# Patient Record
Sex: FEMALE | URBAN - METROPOLITAN AREA
[De-identification: names, ages, dates, MRNs, and addresses within clinical notes are randomized per-mention and may not be internally consistent; named-entity substitution may affect disease eponyms.]

---

## 2020-01-31 ENCOUNTER — OFFICE VISIT (OUTPATIENT)
Dept: OBGYN | Facility: CLINIC | Age: 30
End: 2020-01-31
Attending: NURSE PRACTITIONER
Payer: COMMERCIAL

## 2020-01-31 VITALS
BODY MASS INDEX: 21.09 KG/M2 | HEART RATE: 121 BPM | WEIGHT: 119 LBS | SYSTOLIC BLOOD PRESSURE: 126 MMHG | HEIGHT: 63 IN | DIASTOLIC BLOOD PRESSURE: 80 MMHG

## 2020-01-31 DIAGNOSIS — Z01.812 PRE-PROCEDURE LAB EXAM: ICD-10-CM

## 2020-01-31 DIAGNOSIS — Z83.2 FAMILY HISTORY OF FACTOR V DEFICIENCY: ICD-10-CM

## 2020-01-31 DIAGNOSIS — Z30.430 ENCOUNTER FOR INSERTION OF INTRAUTERINE CONTRACEPTIVE DEVICE: Primary | ICD-10-CM

## 2020-01-31 LAB
HCG UR QL: NEGATIVE
INTERNAL QC OK POCT: YES

## 2020-01-31 PROCEDURE — 58300 INSERT INTRAUTERINE DEVICE: CPT | Mod: ZF | Performed by: NURSE PRACTITIONER

## 2020-01-31 PROCEDURE — 87491 CHLMYD TRACH DNA AMP PROBE: CPT | Performed by: NURSE PRACTITIONER

## 2020-01-31 PROCEDURE — 25000125 ZZHC RX 250: Mod: ZF | Performed by: NURSE PRACTITIONER

## 2020-01-31 PROCEDURE — 81025 URINE PREGNANCY TEST: CPT | Mod: ZF | Performed by: NURSE PRACTITIONER

## 2020-01-31 PROCEDURE — 87591 N.GONORRHOEAE DNA AMP PROB: CPT | Performed by: NURSE PRACTITIONER

## 2020-01-31 PROCEDURE — G0463 HOSPITAL OUTPT CLINIC VISIT: HCPCS | Mod: 25,ZF

## 2020-01-31 RX ORDER — COPPER 313.4 MG/1
1 INTRAUTERINE DEVICE INTRAUTERINE ONCE
COMMUNITY

## 2020-01-31 RX ORDER — COPPER 313.4 MG/1
1 INTRAUTERINE DEVICE INTRAUTERINE ONCE
Status: COMPLETED
Start: 2020-01-31 | End: 2020-01-31

## 2020-01-31 RX ADMIN — COPPER 1 EACH: 313.4 INTRAUTERINE DEVICE INTRAUTERINE at 09:38

## 2020-01-31 SDOH — HEALTH STABILITY: MENTAL HEALTH: HOW MANY DRINKS CONTAINING ALCOHOL DO YOU HAVE ON A TYPICAL DAY WHEN YOU ARE DRINKING?: 1 OR 2

## 2020-01-31 SDOH — HEALTH STABILITY: MENTAL HEALTH: HOW OFTEN DO YOU HAVE 6 OR MORE DRINKS ON ONE OCCASION?: NEVER

## 2020-01-31 SDOH — HEALTH STABILITY: MENTAL HEALTH: HOW OFTEN DO YOU HAVE A DRINK CONTAINING ALCOHOL?: 2-4 TIMES A MONTH

## 2020-01-31 SDOH — HEALTH STABILITY: MENTAL HEALTH: HOW OFTEN DO YOU HAVE SIX OR MORE DRINKS ON ONE OCCASION?: NEVER

## 2020-01-31 SDOH — HEALTH STABILITY: MENTAL HEALTH: HOW MANY STANDARD DRINKS CONTAINING ALCOHOL DO YOU HAVE ON A TYPICAL DAY?: 1 OR 2

## 2020-01-31 ASSESSMENT — PATIENT HEALTH QUESTIONNAIRE - PHQ9
5. POOR APPETITE OR OVEREATING: NOT AT ALL
SUM OF ALL RESPONSES TO PHQ QUESTIONS 1-9: 0

## 2020-01-31 ASSESSMENT — PAIN SCALES - GENERAL: PAINLEVEL: NO PAIN (0)

## 2020-01-31 ASSESSMENT — ANXIETY QUESTIONNAIRES
2. NOT BEING ABLE TO STOP OR CONTROL WORRYING: NOT AT ALL
1. FEELING NERVOUS, ANXIOUS, OR ON EDGE: NOT AT ALL
7. FEELING AFRAID AS IF SOMETHING AWFUL MIGHT HAPPEN: NOT AT ALL
IF YOU CHECKED OFF ANY PROBLEMS ON THIS QUESTIONNAIRE, HOW DIFFICULT HAVE THESE PROBLEMS MADE IT FOR YOU TO DO YOUR WORK, TAKE CARE OF THINGS AT HOME, OR GET ALONG WITH OTHER PEOPLE: NOT DIFFICULT AT ALL
5. BEING SO RESTLESS THAT IT IS HARD TO SIT STILL: NOT AT ALL
3. WORRYING TOO MUCH ABOUT DIFFERENT THINGS: NOT AT ALL
6. BECOMING EASILY ANNOYED OR IRRITABLE: NOT AT ALL
GAD7 TOTAL SCORE: 0

## 2020-01-31 ASSESSMENT — MIFFLIN-ST. JEOR: SCORE: 1243.91

## 2020-01-31 NOTE — PATIENT INSTRUCTIONS
IUD information:     Benefits: The IUD can be 97-99% effective when carefully following directions regarding use. It can be more effective if used with additional contraception. IUD containing progestin may decrease menstrual flow and menstrual cramping.     Risks/Side Effects: include but are not limited to spotting, bleeding, hemorrhage, or anemia: cramping or pain: partial or complete expulsion of device; lost IUD strings; uterine or cervical perforation; embedding of IUD in the uterine wall; increased risk of pelvic inflammatory disease. Women who become pregnant with an IUD in place are at a higher risk for ectopic pregnancy should a pregnancy occur with an IUD in situ. There is a higher rate of miscarriage when pregnancy occurs with IUD in place.    Warning signs: Please call clinic if you have abnormal spotting or heavy bleeding, abdominal pain, pain with intercourse, fever, chills, flu like symptoms, or unable to locate strings of IUD, or strings are longer or shorter than expected.    You are encouraged to use condoms for prevention of STD. You may use pain medications (ibuprofen) as needed for mild to moderate pain. Please follow-up in clinic in 4-6 weeks for IUD string check     This IUD is effective for 10-12 yrs.    Return to clinic for annual preventative exam in 11/2020.

## 2020-01-31 NOTE — LETTER
2/4/2020         Chloe Juarez   [address] [place] [address]        Dear Ms. Juarez:    The results of your recent gonorrhea & chlamydia were normal.     Results for orders placed or performed in visit on 01/31/20   hCG qual urine POCT     Status: None   Result Value Ref Range    HCG Qual Urine Negative neg    Internal QC OK Yes    Chlamydia PCR (Clinic Collect)     Status: None   Result Value Ref Range    Specimen Description Cervix     Chlamydia Trachomatis PCR Negative NEG^Negative   Gonorrhea PCR     Status: None   Result Value Ref Range    Specimen Descrip Cervix     N Gonorrhea PCR Negative NEG^Negative         Please note that test explanations are brief and do not reflect all diagnostic uses.  If you have any questions or concerns, please call the clinic at [phone].      Sincerely,  Bernadette Aldana, YNONY, APRN, WHNP

## 2020-01-31 NOTE — NURSING NOTE
Clinic Administered Medication Documentation    MEDICATION LIST:   Intrauterine/Implant Insertion Documentation    Device was placed by provider (please see MAR for given by information). Please see MAR and medication order for additional information.     Type: Paragard  Remove/Replace by: Bernadette Hanna     Exp: 1-2026

## 2020-01-31 NOTE — PROGRESS NOTES
"IUD Insertion:  CONSULT:    Is a pregnancy test required: Yes.  Was it positive or negative?  Negative  Was a consent obtained?  Yes    Subjective: Chloe Juarez is a 29 year old  presents for IUD and desires Paragard type IUD for contraception.     Patient's sister has a hx of Factor V Leiden and has gotten a blood clot. The patient has completed evaluation for this, and does not have Factor V, but has an \"elevated protein\" level and was recommended not use hormonal birth control. She was recommended to follow-up with a hematologist if she desired to get pregnant.     Last sexually active 6-9 months ago.   Menses: q26-28 days, bleeding lasts 4-5 days, first day is heavy and then light. Dysmenorrhea x 1 day.     Chloe moved to [facility] from [place] in October to live with her fiance. Their wedding is May 2020.     Patient has been given the opportunity to ask questions about all forms of birth control, including all options appropriate for Chloe Juarez. Discussed that no method of birth control, except abstinence is 100% effective against pregnancy or sexually transmitted infection.     Chloe Juarez understands she may have the IUD removed at any time. IUD should be removed by a health care provider.    The entire insertion procedure was reviewed with the patient, including care after placement.    Patient's last menstrual period was 2020. Last sexual activity: 6-8 months ago. No allergy to betadine or shellfish. Patient desires STD screening  HCG Qual Urine   Date Value Ref Range Status   2020 Negative neg Final       /80   Pulse 121   Ht 1.6 m (5' 3\")   Wt 54 kg (119 lb)   LMP 2020   BMI 21.08 kg/m      Pelvic Exam:   EG/BUS: normal genital architecture without lesions, erythema or abnormal secretions.   Vagina: moist, pink, rugae with physiologic discharge and secretions  Cervix: nuliparous no lesions and pink, moist, closed, without lesion or CMT  Uterus: " anteverted position, mobile, no pain  Adnexa: within normal limits and no masses, nodularity, tenderness    PROCEDURE NOTE: -- IUD Insertion    Reason for Insertion: contraception    Premedicated with ibuprofen.  Under sterile technique, cervix was visualized with speculum and prepped with Betadine solution swab x 3. Tenaculum was placed for stability. The uterus was gently straightened and sounded to 7.0 cm. IUD prepared for placement, and IUD inserted according to 's instructions without difficulty or significant resitance, and deployed at the fundus. The strings were visualized and trimmed to 3.0 cm from the external os. Tenaculum was removed and hemostasis noted. Speculum removed.  Patient tolerated procedure well.    Lot # [id number]  Exp: Jan 2026    EBL: minimal    Complications: none    ASSESSMENT:     ICD-10-CM    1. Encounter for insertion of intrauterine contraceptive device Z30.430 paragard intrauterine copper device     paragard intrauterine copper IUD device 1 each     INSERTION INTRAUTERINE DEVICE   2. Pre-procedure lab exam Z01.812 hCG qual urine POCT     Chlamydia PCR (Clinic Collect)     Gonorrhea PCR        PLAN:    Given 's handouts, including when to have IUD removed, list of danger s/sx, side effects and follow up recommended. Encouraged condom use for prevention of STD. Advised to call for any fever, for prolonged or severe pain or bleeding, abnormal vaginal discharge, or unable to palpate strings. She was advised to use pain medications (ibuprofen) as needed for mild to moderate pain. Advised to follow-up in clinic in 4-6 weeks for IUD string check. This IUD is effective immediately for contraception. It may remain in place for 10-12 yrs.  Gonorrhea & chlamydia tests were completed today. Pt will be notified of results within 3-5 days by [facility].     Chloe expressed understanding and agreement with the plan for care.     Bernadette Aldana, ROSELIA CNP

## 2020-01-31 NOTE — LETTER
"2020       RE: Chloe Juarez  [address] [place] [address]     Dear Colleague,    Thank you for referring your patient, Chloe Juarez, to the [facility] Please see a copy of my visit note below.    IUD Insertion:  CONSULT:    Is a pregnancy test required: Yes.  Was it positive or negative?  Negative  Was a consent obtained?  Yes    Subjective: Chloe Juarez is a 29 year old  presents for IUD and desires Paragard type IUD for contraception.     Patient's sister has a hx of Factor V Leiden and has gotten a blood clot. The patient has completed evaluation for this, and does not have Factor V, but has an \"elevated protein\" level and was recommended not use hormonal birth control. She was recommended to follow-up with a hematologist if she desired to get pregnant.     Last sexually active 6-9 months ago.   Menses: q26-28 days, bleeding lasts 4-5 days, first day is heavy and then light. Dysmenorrhea x 1 day.     Chloe moved to [facility] from [place] in October to live with her fiance. Their wedding is May 2020.     Patient has been given the opportunity to ask questions about all forms of birth control, including all options appropriate for Chloe Juarez. Discussed that no method of birth control, except abstinence is 100% effective against pregnancy or sexually transmitted infection.     Chloe Juarez understands she may have the IUD removed at any time. IUD should be removed by a health care provider.    The entire insertion procedure was reviewed with the patient, including care after placement.    Patient's last menstrual period was 2020. Last sexual activity: 6-8 months ago. No allergy to betadine or shellfish. Patient desires STD screening  HCG Qual Urine   Date Value Ref Range Status   2020 Negative neg Final       /80   Pulse 121   Ht 1.6 m (5' 3\")   Wt 54 kg (119 lb)   LMP " 01/21/2020   BMI 21.08 kg/m       Pelvic Exam:   EG/BUS: normal genital architecture without lesions, erythema or abnormal secretions.   Vagina: moist, pink, rugae with physiologic discharge and secretions  Cervix: nuliparous no lesions and pink, moist, closed, without lesion or CMT  Uterus: anteverted position, mobile, no pain  Adnexa: within normal limits and no masses, nodularity, tenderness    PROCEDURE NOTE: -- IUD Insertion    Reason for Insertion: contraception    Premedicated with ibuprofen.  Under sterile technique, cervix was visualized with speculum and prepped with Betadine solution swab x 3. Tenaculum was placed for stability. The uterus was gently straightened and sounded to 7.0 cm. IUD prepared for placement, and IUD inserted according to 's instructions without difficulty or significant resitance, and deployed at the fundus. The strings were visualized and trimmed to 3.0 cm from the external os. Tenaculum was removed and hemostasis noted. Speculum removed.  Patient tolerated procedure well.    Lot # [id number]  Exp: Jan 2026    EBL: minimal    Complications: none    ASSESSMENT:     ICD-10-CM    1. Encounter for insertion of intrauterine contraceptive device Z30.430 paragard intrauterine copper device     paragard intrauterine copper IUD device 1 each     INSERTION INTRAUTERINE DEVICE   2. Pre-procedure lab exam Z01.812 hCG qual urine POCT     Chlamydia PCR (Clinic Collect)     Gonorrhea PCR        PLAN:    Given 's handouts, including when to have IUD removed, list of danger s/sx, side effects and follow up recommended. Encouraged condom use for prevention of STD. Advised to call for any fever, for prolonged or severe pain or bleeding, abnormal vaginal discharge, or unable to palpate strings. She was advised to use pain medications (ibuprofen) as needed for mild to moderate pain. Advised to follow-up in clinic in 4-6 weeks for IUD string check. This IUD is effective immediately  for contraception. It may remain in place for 10-12 yrs.  Gonorrhea & chlamydia tests were completed today. Pt will be notified of results within 3-5 days by MyChart.     Chloe expressed understanding and agreement with the plan for care.     ROSELIA Crowley CNP

## 2020-02-01 ASSESSMENT — ANXIETY QUESTIONNAIRES: GAD7 TOTAL SCORE: 0

## 2020-02-02 LAB
C TRACH DNA SPEC QL NAA+PROBE: NEGATIVE
N GONORRHOEA DNA SPEC QL NAA+PROBE: NEGATIVE
SPECIMEN SOURCE: NORMAL
SPECIMEN SOURCE: NORMAL

## 2021-01-04 ENCOUNTER — HEALTH MAINTENANCE LETTER (OUTPATIENT)
Age: 31
End: 2021-01-04

## 2021-10-10 ENCOUNTER — HEALTH MAINTENANCE LETTER (OUTPATIENT)
Age: 31
End: 2021-10-10

## 2022-01-30 ENCOUNTER — HEALTH MAINTENANCE LETTER (OUTPATIENT)
Age: 32
End: 2022-01-30

## 2022-09-18 ENCOUNTER — HEALTH MAINTENANCE LETTER (OUTPATIENT)
Age: 32
End: 2022-09-18

## 2023-05-08 ENCOUNTER — HEALTH MAINTENANCE LETTER (OUTPATIENT)
Age: 33
End: 2023-05-08